# Patient Record
Sex: FEMALE | Race: WHITE | Employment: OTHER | URBAN - METROPOLITAN AREA
[De-identification: names, ages, dates, MRNs, and addresses within clinical notes are randomized per-mention and may not be internally consistent; named-entity substitution may affect disease eponyms.]

---

## 2018-12-12 ENCOUNTER — OFFICE VISIT (OUTPATIENT)
Dept: ENDOCRINOLOGY | Age: 83
End: 2018-12-12
Payer: MEDICARE

## 2018-12-12 VITALS
WEIGHT: 180.6 LBS | SYSTOLIC BLOOD PRESSURE: 118 MMHG | BODY MASS INDEX: 33.23 KG/M2 | DIASTOLIC BLOOD PRESSURE: 70 MMHG | RESPIRATION RATE: 16 BRPM | HEIGHT: 62 IN | OXYGEN SATURATION: 94 % | HEART RATE: 69 BPM

## 2018-12-12 DIAGNOSIS — E55.9 VITAMIN D DEFICIENCY: ICD-10-CM

## 2018-12-12 DIAGNOSIS — M81.0 OSTEOPOROSIS, UNSPECIFIED OSTEOPOROSIS TYPE, UNSPECIFIED PATHOLOGICAL FRACTURE PRESENCE: Primary | ICD-10-CM

## 2018-12-12 DIAGNOSIS — E03.9 HYPOTHYROIDISM, UNSPECIFIED TYPE: ICD-10-CM

## 2018-12-12 LAB — HBA1C MFR BLD: 6.3 %

## 2018-12-12 PROCEDURE — 99214 OFFICE O/P EST MOD 30 MIN: CPT | Performed by: INTERNAL MEDICINE

## 2018-12-12 PROCEDURE — 83036 HEMOGLOBIN GLYCOSYLATED A1C: CPT | Performed by: INTERNAL MEDICINE

## 2018-12-12 RX ORDER — OMEPRAZOLE 20 MG/1
40 CAPSULE, DELAYED RELEASE ORAL DAILY
COMMUNITY

## 2018-12-12 RX ORDER — ISOSORBIDE MONONITRATE 120 MG/1
120 TABLET, EXTENDED RELEASE ORAL DAILY
COMMUNITY

## 2018-12-12 RX ORDER — PREDNISONE 1 MG/1
7.5 TABLET ORAL DAILY
COMMUNITY

## 2018-12-12 RX ORDER — LEVOTHYROXINE SODIUM 0.1 MG/1
100 TABLET ORAL DAILY
Qty: 90 TABLET | Refills: 5 | Status: SHIPPED
Start: 2018-12-12 | End: 2020-03-31

## 2018-12-12 RX ORDER — MAGNESIUM CHLORIDE 64 MG
1 TABLET, DELAYED RELEASE (ENTERIC COATED) ORAL
COMMUNITY

## 2018-12-12 RX ORDER — SOLIFENACIN SUCCINATE 10 MG/1
10 TABLET, FILM COATED ORAL DAILY
COMMUNITY

## 2018-12-12 RX ORDER — NITROGLYCERIN 0.4 MG/1
0.4 TABLET SUBLINGUAL EVERY 5 MIN PRN
COMMUNITY

## 2018-12-12 RX ORDER — ASCORBIC ACID 500 MG
500 TABLET ORAL DAILY
COMMUNITY

## 2018-12-12 RX ORDER — LEVOTHYROXINE SODIUM 0.1 MG/1
100 TABLET ORAL DAILY
COMMUNITY
End: 2018-12-12 | Stop reason: SDUPTHER

## 2018-12-12 RX ORDER — TIMOLOL MALEATE 6.8 MG/ML
1 SOLUTION/ DROPS OPHTHALMIC 2 TIMES DAILY
COMMUNITY

## 2018-12-12 RX ORDER — GABAPENTIN 100 MG/1
100 CAPSULE ORAL NIGHTLY
COMMUNITY

## 2018-12-12 RX ORDER — CHOLECALCIFEROL (VITAMIN D3) 125 MCG
500 CAPSULE ORAL DAILY
COMMUNITY

## 2018-12-12 RX ORDER — FUROSEMIDE 40 MG/1
40 TABLET ORAL DAILY
COMMUNITY

## 2018-12-12 RX ORDER — LISINOPRIL 2.5 MG/1
2.5 TABLET ORAL NIGHTLY
COMMUNITY

## 2018-12-12 RX ORDER — METOPROLOL SUCCINATE 25 MG/1
25 TABLET, EXTENDED RELEASE ORAL NIGHTLY
COMMUNITY

## 2018-12-12 RX ORDER — DORZOLAMIDE HCL 20 MG/ML
1 SOLUTION/ DROPS OPHTHALMIC 2 TIMES DAILY
COMMUNITY

## 2018-12-12 RX ORDER — ROSUVASTATIN CALCIUM 20 MG/1
20 TABLET, COATED ORAL DAILY
COMMUNITY

## 2018-12-12 RX ORDER — VITAMIN E 268 MG
400 CAPSULE ORAL DAILY
COMMUNITY

## 2018-12-12 RX ORDER — CHLORAL HYDRATE 500 MG
3000 CAPSULE ORAL DAILY
COMMUNITY

## 2018-12-12 NOTE — PROGRESS NOTES
ENDOCRINOLOGY CLINIC NOTE    Date of Service: 12/12/2018    Medical Records Reviewed:   Inpatient records, outpatient records, outside records     Care Team:  Primary Care Physician: Dante Mata MD.  Provider: Joel Joaquin MD  Other provider(s):            Reason for the visit:  Type II DM, primary hypothyroidism, Osteoporosis, vitd deficiency,      Type of Visit:  Follow up     History of Present Illness  I had the pleasure of seeing Law Villatoro who is 80year old female in the clinic today for follow up visit   Pedro Smith presents with Type 2 Diabetes Mellitus, hypothyroidism. Osteopenia, History HPTH  Here with Dtr    1. History of primary hyperparathyroidism. s/p parathyroidectomy, large parathyroid adenoma by Dr. Rose Neff in September 2004. She has had only occasional transient hypercalcemia since then nothing in the recent past    10/2018 : Calcium - 9.2    2. Osteoporosis/vitD deficiency   She denies any history fractures. + Chronic oral steroid for years via rheumatology  April 9, 2012, DXA femoral neck T-score of -2, total hip T-score of -1.3 and lumbar spine T-score is 1.4. When we omit L2 and L3 due to osteophytes in vertebra, T-score of lumbar spine is -2. FRAX score;major osteoporotic fracture risk in the next 10 years is 23% and hip fracture is 9.7%. Bisphosphonates are contraindicated as estimated GFR is only 35     Patient received 4 doses of prolia before, restarted prolia in the recent past. Last dose 8/2018     The bone mineral density in 10/2014 --> BMD has increased by 22.5% since the prior study of 04/09/2012 and has increased by 14.3% since the baseline study of 11/29/2000    The bone mineral density in 10/2016 --> BMD has decreased by 2.9% hip, 4.9%forarm since the prior study of 2014     On vitD3 5000 units daily  VitD level now 41.9 was 43.3 was 35.3   The patient denies any history of kidney stones    4. Type 2 diabetes mellitus.    Currently on Basaglar 20 units at bedtime, MG tablet Take 20 mg by mouth daily      Timolol (TIMOPTIC) 0.5 % (DAILY) SOLN ophthalmic solution Place 1 drop into both eyes 2 times daily      dorzolamide (TRUSOPT) 2 % ophthalmic solution Place 1 drop into both eyes 2 times daily      solifenacin (VESICARE) 10 MG tablet Take 5 mg by mouth daily      vitamin B-12 (CYANOCOBALAMIN) 500 MCG tablet Take 500 mcg by mouth daily      vitamin E 400 UNIT capsule Take 400 Units by mouth daily      isosorbide mononitrate (IMDUR) 120 MG extended release tablet Take 120 mg by mouth daily      metoprolol succinate (TOPROL XL) 25 MG extended release tablet Take 25 mg by mouth nightly      gabapentin (NEURONTIN) 100 MG capsule Take 100 mg by mouth 2 times daily. Mino Rough denosumab (PROLIA) 60 MG/ML SOLN SC injection Inject 1 mL into the skin once for 1 dose 1 Syringe 5    insulin glargine (BASAGLAR KWIKPEN) 100 UNIT/ML injection pen Inject 20 Units into the skin nightly 15 pen 5    levothyroxine (SYNTHROID) 100 MCG tablet Take 1 tablet by mouth Daily 90 tablet 5    insulin lispro (HUMALOG KWIKPEN) 100 UNIT/ML pen Take 6 units before breakfast, 6 units before lunch and 9 units before supper + sliding scale. MAX 25 units daily 15 pen 5    Insulin Pen Needle (BD PEN NEEDLE JAVIER U/F) 32G X 4 MM MISC 1 each by Does not apply route 4 times daily With insulin 350 each 5     No current facility-administered medications for this visit. Review of Systems  Constitutional: tired and fatigue. HEENT: No blurred vision, No sore throat, no ear pain, no hair loss  Neck: denied any neck swelling, difficulty swallowing,   Cardio-pulmonary: No CP, SOB or palpitation, No orthopnea or PND. No cough or wheezing. GI: No N/V/D, no constipation, No abdominal pain, no melena or hematochezia   : Denied any dysuria, hematuria, flank pain, discharge, or incontinence. Skin: denied any rash, ulcer, Hirsute, or hyperpigmentation.    MSK: denied any joint deformity, joint pain/swelling,

## 2018-12-12 NOTE — LETTER
ALT 26 03/06/2018   TOTALBILI 0.4 03/06/2018   TOTPROT 6.8 03/06/2018   ALBUMIN 3.2 (L) 03/06/2018      No results found for: WBC, RBC, HGB, HCT, MCV, MCH, MCHC, RDW, PLT, MPV, GRANULOCYTES, BANDS   No results found for: NA, K, CO2, BUN, CALCIUM, GFR   No results found for: TSH, T4FREE, D3IOXFF, FT3, Z5WVUEN, TSI, TPOABS, THGAB  Lab Results   Component Value Date    LABA1C 6.3 12/12/2018     No results found for: CHOL, TRIG, HDL, LDLDIRECT  No results found for: VITD25    All labs medical records and images were reviewed independently     Additional Data Reviewed: Individual visualization of point-of-care blood glucose levels and medications doses    ASSESSMENT & RECOMMENDATIONS   Bill Navarro, a 80 y.o.-old female seen in for the following issues     Osteoporosis/VitD deficiency   · History of chronic steroid use and previous hyperparathyroidism  · Tolerating prolia very well  · In the past she received 4 doses of prolia ([id number])  · We resumed Prolia in 2017, last dose 8/2018 ( Dtr aware when next dose due 2/2019)  · Continue Vitamin D 5000 units daily and check Vitamin D soon before prolia injection  · Avoid prolonged immobilization, exercise advised    Secondary Adrenal Insufficiency  · Due to chronic steroid use. Pt on prolonged prednisone therapy for PMR   · Patient need to be on at least maintenance dose of steroid all the time and stress dose for any stressful events. Primary Hhypothyroidism  · Continue current Levothyroxine 100 mcg daily     Type II DM   · Fair control  · High risk for falls, need to limit risk of hypoglycemia  · Continue basaglar 20 units at bedtime, change Humalog before meals to 6/6/9  + sliding scale 1:50>150   · The patient was advised to check blood sugars 3 times a day before meals and at bedtime and fax the results to our office in few weeks.   · Discussed with patient A1c and blood sugar goals   · Optimal blood sugars: 100-140 pre-prandial, < 180 peak post-prandial · The patient counseled about the complications of uncontrolled diabetes   · Patient was counselled about the importance of self-blood glucose monitoring and eating consistent carb diet to avoid blood sugar fluctuations   · Patient up to date with the routine diabetes maintenance and prevention  · Diabetes labs before next visit     H/o Hypercalcemia  · No overt elevation since late 2015  · Needs followed over time     Follow up  · 4/2019     The above issues were reviewed with the patient who understood and agreed with the plan. 30 minutes were spent today in management of this patient. More than 50% of time spent on counseling of patient on above diagnosis. Thank you for allowing us to participate in the care of this patient. Please do not hesitate to contact us with any additional questions.      Cheryl Gardner MD  Endocrinologist, [facility])   [address] [place], [place] [address]   Phone: [phone]  Fax: [phone]  --------------------------------------------  Electronically signed

## 2019-02-13 ENCOUNTER — TELEPHONE (OUTPATIENT)
Dept: ENDOCRINOLOGY | Age: 84
End: 2019-02-13

## 2019-04-01 ENCOUNTER — OFFICE VISIT (OUTPATIENT)
Dept: ENDOCRINOLOGY | Age: 84
End: 2019-04-01
Payer: MEDICARE

## 2019-04-01 VITALS
HEART RATE: 77 BPM | SYSTOLIC BLOOD PRESSURE: 112 MMHG | OXYGEN SATURATION: 95 % | BODY MASS INDEX: 32.13 KG/M2 | RESPIRATION RATE: 16 BRPM | DIASTOLIC BLOOD PRESSURE: 78 MMHG | WEIGHT: 174.6 LBS | HEIGHT: 62 IN

## 2019-04-01 DIAGNOSIS — E03.9 HYPOTHYROIDISM, UNSPECIFIED TYPE: ICD-10-CM

## 2019-04-01 DIAGNOSIS — E55.9 VITAMIN D DEFICIENCY: ICD-10-CM

## 2019-04-01 DIAGNOSIS — M81.0 OSTEOPOROSIS, UNSPECIFIED OSTEOPOROSIS TYPE, UNSPECIFIED PATHOLOGICAL FRACTURE PRESENCE: Primary | ICD-10-CM

## 2019-04-01 PROCEDURE — G8417 CALC BMI ABV UP PARAM F/U: HCPCS | Performed by: INTERNAL MEDICINE

## 2019-04-01 PROCEDURE — 4040F PNEUMOC VAC/ADMIN/RCVD: CPT | Performed by: INTERNAL MEDICINE

## 2019-04-01 PROCEDURE — 99214 OFFICE O/P EST MOD 30 MIN: CPT | Performed by: INTERNAL MEDICINE

## 2019-04-01 PROCEDURE — 1123F ACP DISCUSS/DSCN MKR DOCD: CPT | Performed by: INTERNAL MEDICINE

## 2019-04-01 PROCEDURE — G8427 DOCREV CUR MEDS BY ELIG CLIN: HCPCS | Performed by: INTERNAL MEDICINE

## 2019-04-01 PROCEDURE — 1090F PRES/ABSN URINE INCON ASSESS: CPT | Performed by: INTERNAL MEDICINE

## 2019-04-01 PROCEDURE — 1036F TOBACCO NON-USER: CPT | Performed by: INTERNAL MEDICINE

## 2019-04-01 NOTE — PROGRESS NOTES
ENDOCRINOLOGY CLINIC NOTE    Date of Service: 4/1/2019    Care Team:  Primary Care Physician: Dayton Sullivan MD.  Provider: Britany Thompson MD  Other provider(s):            Reason for the visit:  Insulin dependent diabetes mellitus, primary hypothyroidism, Osteoporosis, vitd deficiency,     History of Present Illness  I had the pleasure of seeing Ganesh Bates who is 80y.o. year old female in the clinic today for follow up visit   Edelmira Rico presents with Diabetes Mellitus, hypothyroidism. Osteopenia, History HPTH  Here with Dtr    History of primary hyperparathyroidism. s/p parathyroidectomy, large parathyroid adenoma by Dr. Carlo Yusuf in September 2004. She has had only occasional transient hypercalcemia since then nothing in the recent past  3/4/2019 - Calcium 9.6, PTH 65,    Osteoporosis/vitD deficiency   She denies any history fractures. + Chronic oral steroid for years via rheumatology  April 9, 2012, DXA femoral neck T-score of -2, total hip T-score of -1.3 and lumbar spine T-score is 1.4. When we omit L2 and L3 due to osteophytes in vertebra, T-score of lumbar spine is -2. FRAX score;major osteoporotic fracture risk in the next 10 years is 23% and hip fracture is 9.7%. Bisphosphonates are contraindicated as estimated GFR < 35   Tolerating prolia very well  In the past she received 4 doses of prolia ([id number])  We resumed Prolia in 2017, last dose 2/2019 ( Dtr aware when next dose due 8/2019)  Received Prolia injection at infusion center in [facility]     The bone mineral density in 10/2014 --> BMD has increased by 22.5% since the prior study of 04/09/2012 and has increased by 14.3% since the baseline study of 11/29/2000    The bone mineral density in 10/2016 --> BMD has decreased by 2.9% hip, 4.9%forarm since the prior study of 2014     On vitD3 5000 units daily  VitD level 53 was 41.9 was 43.3 was 35.3   The patient denies any history of kidney stones    Type 2 diabetes mellitus.    Currently on DeckDAQ 20 units at bedtime, Humalog 8 units before breakfast,10 units before lunch and 9 units before dinner. +ssi 1u/50>150    Check BG 4 times/day most readings at goal   HA1c: 7.4 was 6.3 was 7.8 was 7.7 was 7.8 was 7.0 was 7.0  Microalbumin ratio 3/2018- 16.8 was 34.1, creatinine 1.07    Reports Sx of low BSs in the afternoon related to house work, mild easy to treat  Gained 8 pounds since last apt - has some fluid retention  Has back and leg pain - long term issue    Microvascular complications: +early diabetic retinopathy, but did not require laser treatment. Macrovascular complications: CAD  Routine podiatrist care every 3 months   Routine eye exam: q 3 months - last exam 10/15/18 - has macular degeneration - had recent avastin injection  Flu shot yearly, Pneumovax previously    Primary Hypothyroidism - has been stable on synthroid 100 mcg daily. Patient takes Synthroid in the morning at empty stomach, wait one hour before eating , avoid multivitamins containing calcium  or iron with it. 3/4/2019 - TSH 1.88, Free T4 1.03       Secondary adrenal insufficiency  She is on prednisone treatment for more than 10 years, anywhere between 5-7.5 mg for polymyalgia rheumatica.   This is prescribed by Dr. Cesar Moser , Taking 7.5 mg Daily for 1-2 years \"he wants to raise it but sugars will go up\"      PAST MEDICAL HISTORY   Past Medical History:   Diagnosis Date    B12 deficiency     Current chronic use of systemic steroids     DM type 2 (diabetes mellitus, type 2) ([address]     GERD (gastroesophageal reflux disease)     Hypercalcemia     Hyperlipidemia     Osteoporosis     PMR (polymyalgia rheumatica) (HCC)     Primary hyperparathyroidism ([address]     Secondary adrenal insufficiency (HCC)     Vitamin D deficiency      PAST SURGICAL HISTORY   Past Surgical History:   Procedure Laterality Date    CHOLECYSTECTOMY      PARATHYROIDECTOMY      TOTAL HIP ARTHROPLASTY Left     Lt    TOTAL KNEE ARTHROPLASTY Right      SOCIAL HISTORY   Social History     Socioeconomic History    Marital status:       Spouse name: Not on file    Number of children: Not on file    Years of education: Not on file    Highest education level: Not on file   Occupational History    Not on file   Social Needs    Financial resource strain: Not on file    Food insecurity:     Worry: Not on file     Inability: Not on file    Transportation needs:     Medical: Not on file     Non-medical: Not on file   Tobacco Use    Smoking status: Never Smoker    Smokeless tobacco: Never Used   Substance and Sexual Activity    Alcohol use: No    Drug use: No    Sexual activity: Not on file   Lifestyle    Physical activity:     Days per week: Not on file     Minutes per session: Not on file    Stress: Not on file   Relationships    Social connections:     Talks on phone: Not on file     Gets together: Not on file     Attends Latter day service: Not on file     Active member of club or organization: Not on file     Attends meetings of clubs or organizations: Not on file     Relationship status: Not on file    Intimate partner violence:     Fear of current or ex partner: Not on file     Emotionally abused: Not on file     Physically abused: Not on file     Forced sexual activity: Not on file   Other Topics Concern    Not on file   Social History Narrative    Not on file     FAMILY HISTORY   Family History   Problem Relation Age of Onset    Diabetes Mother     Thyroid Disease Mother     Heart Disease Father     Diabetes Sister     Heart Disease Sister     Heart Attack Brother     Diabetes Sister     Heart Disease Sister     Heart Attack Brother     Diabetes Daughter     No Known Problems Son      ALLERGIES AND DRUG REACTIONS   No Known Allergies    CURRENT MEDICATIONS     Current Outpatient Medications   Medication Sig Dispense Refill    vitamin C (ASCORBIC ACID) 500 MG tablet Take 500 mg by mouth daily      aspirin 81 MG tablet Take 81 mg by mouth daily      Cholecalciferol (VITAMIN D3) 5000 units TABS Take by mouth daily      Coenzyme Q10 (COQ-10) 100 MG CAPS Take by mouth daily      Omega-3 Fatty Acids (FISH OIL) 1000 MG CAPS Take 3,000 mg by mouth daily      furosemide (LASIX) 40 MG tablet Take 40 mg by mouth daily      lisinopril (PRINIVIL;ZESTRIL) 2.5 MG tablet Take 2.5 mg by mouth nightly      magnesium chloride (MAG64) 64 MG TBEC extended release tablet Take 1 tablet by mouth daily (with breakfast)      nitroGLYCERIN (NITROSTAT) 0.4 MG SL tablet Place 0.4 mg under the tongue every 5 minutes as needed for Chest pain up to max of 3 total doses. If no relief after 1 dose, call 911.  omeprazole (PRILOSEC) 20 MG delayed release capsule Take 20 mg by mouth 2 times daily      predniSONE (DELTASONE) 5 MG tablet Take 7.5 mg by mouth daily      Multiple Vitamins-Minerals (PRESERVISION AREDS PO) Take by mouth daily      rosuvastatin (CRESTOR) 20 MG tablet Take 20 mg by mouth daily      Timolol (TIMOPTIC) 0.5 % (DAILY) SOLN ophthalmic solution Place 1 drop into both eyes 2 times daily      dorzolamide (TRUSOPT) 2 % ophthalmic solution Place 1 drop into both eyes 2 times daily      solifenacin (VESICARE) 10 MG tablet Take 5 mg by mouth daily      vitamin B-12 (CYANOCOBALAMIN) 500 MCG tablet Take 500 mcg by mouth daily      vitamin E 400 UNIT capsule Take 400 Units by mouth daily      isosorbide mononitrate (IMDUR) 120 MG extended release tablet Take 120 mg by mouth daily      metoprolol succinate (TOPROL XL) 25 MG extended release tablet Take 25 mg by mouth nightly      gabapentin (NEURONTIN) 100 MG capsule Take 100 mg by mouth daily.        denosumab (PROLIA) 60 MG/ML SOLN SC injection Inject 1 mL into the skin once for 1 dose 1 Syringe 5    insulin glargine (BASAGLAR KWIKPEN) 100 UNIT/ML injection pen Inject 20 Units into the skin nightly 15 pen 5    levothyroxine (SYNTHROID) 100 MCG tablet Take 1 tablet by mouth Daily 90 tablet 5    insulin lispro (HUMALOG KWIKPEN) 100 UNIT/ML pen Take 6 units before breakfast, 6 units before lunch and 9 units before supper + sliding scale. MAX 25 units daily 15 pen 5    Insulin Pen Needle (BD PEN NEEDLE JAVIER U/F) 32G X 4 MM MISC 1 each by Does not apply route 4 times daily With insulin 350 each 5     No current facility-administered medications for this visit. Review of Systems  Constitutional: tired and fatigue. HEENT: No blurred vision, No sore throat, no ear pain, no hair loss  Neck: denied any neck swelling, difficulty swallowing,   Cardio-pulmonary: No CP, SOB or palpitation, No orthopnea or PND. No cough or wheezing. GI: No N/V/D, no constipation, No abdominal pain, no melena or hematochezia   : Denied any dysuria, hematuria, flank pain, discharge, or incontinence. Skin: denied any rash, ulcer, Hirsute, or hyperpigmentation. MSK: denied any joint deformity, joint pain/swelling, muscle pain, or back pain. Neuro: balance problem, using walker     OBJECTIVE    /78 (Site: Right Upper Arm, Position: Sitting, Cuff Size: Medium Adult)   Pulse 77   Resp 16   Ht 5' 2\" (1.575 m)   Wt 174 lb 9.6 oz (79.2 kg)   SpO2 95%   BMI 31.93 kg/m²   BP Readings from Last 4 Encounters:   04/01/19 112/78   12/12/18 118/70     Wt Readings from Last 6 Encounters:   04/01/19 174 lb 9.6 oz (79.2 kg)   12/12/18 180 lb 9.6 oz (81.9 kg)       Physical examination:  General: awake alert, oriented x3, no abnormal position or movements. HEENT: normocephalic non-traumatic, no exophthalmos   Neck: supple, no LN enlargement, no thyromegaly, no thyroid tenderness, no JVD. Pulm: Clear equal air entry no added sounds, no wheezing or rhonchi    CVS: S1 + S2, no murmur, no heave. Dorsalis pedis pulse palpable but weak. + LE edema bilateral    Abd: soft lax, no tenderness, no organomegaly, audible bowel sounds.    Skin: warm, no lesions, no rash. + callus, no Ulcers, mild acanthosis nigricans   Neuro: CN intact, Monofilament sensation decreased bilateral , + balance problem using walker   Psych: normal mood, and affect      Review of Laboratory Data:  I have reviewed the following:  BASIC METABOLIC PANELResulted: 5/0/9951 3:25 PM  [facility] ([address])  Component Name Value   Glucose 139 (H)   Urea Nitrogen 32 (H)   Creatinine 0.97   BUN/CREATININE RATIO 33   Sodium(Na) 137   Potassium(K) 4.6   Chloride(Cl) 103   Carbon Dioxide(CO2) 31.0   ANION GAP 7.6   Calcium(Ca) 9.6   CALCULATED OSMOLALITY 293   EGFR NON  52 (L)     LIPID BATTERYResulted: 3/4/2019 3:25 PM  Component Name Value   Cholesterol 107   Triglyceride 159 (H)   High Density Lipoprotein(HDL) 47   Very Low Density Lipoprotein 32   Low Density Lipoprotein 28     VITAMIN D 25-OH SCREEN FOR DEFICIENCY/TOXICITYResulted: 3/7/2019   Component Name Value   Vitamin D, 25-Hydroxy 53     3/4/2019 - A1c 7.5, PTH 65, TSH 1.88, Free T4 1.03   No results found for: WBC, RBC, HGB, HCT, MCV, MCH, MCHC, RDW, PLT, MPV, GRANULOCYTES, BANDS   No results found for: NA, K, CO2, BUN, CALCIUM, GFR   No results found for: TSH, T4FREE, B8CTTVH, FT3, X9GMOHI, TSI, TPOABS, THGAB  Lab Results   Component Value Date    LABA1C 6.3 12/12/2018     No results found for: CHOL, TRIG, HDL, LDLDIRECT  No results found for: VITD25    Medical Records/Labs/Images review:   I personally reviewed and summarized previous records   All labs and imaging were reviewed independently     [facility], a 80 y.o.-old female seen in for the following issues     Osteoporosis/VitD deficiency   · History of chronic steroid use and previous hyperparathyroidism  · Tolerating prolia very well  · In the past she received 4 doses of prolia ([id number])  · We resumed Prolia in 2017, last dose 2/2019 ( Dtr aware when next dose due 8/2019)  · Continue Vitamin D 5000 units daily  · Avoid prolonged immobilization, exercise advised    Secondary Adrenal Insufficiency  · Due to chronic steroid use. Pt on prolonged prednisone therapy for PMR   · Patient need to be on at least maintenance dose of steroid all the time and stress dose for any stressful events. Primary Hhypothyroidism  · Continue current Levothyroxine 100 mcg daily     Type 2 DM   · High risk for falls, need to limit risk of hypoglycemia  · Will slightly change basaglar 22 units at bedtime, change Humalog before meals to 88/10  + sliding scale 1:50>150   · The patient was advised to check blood sugars 4 times a day before meals and at bedtime and fax the results to our office in few weeks. · Discussed with patient A1c and blood sugar goals   · Optimal blood sugars: 100-140 pre-prandial, < 180 peak post-prandial  · The patient counseled about the complications of uncontrolled diabetes   · Patient was counselled about the importance of self-blood glucose monitoring and eating consistent carb diet to avoid blood sugar fluctuations   · Patient up to date with the routine diabetes maintenance and prevention    H/o primary hyperparathyroidism   · s/p parathyroidectomy, large parathyroid adenoma by Dr. Franny Cates in September 2004  · Ca and PTH remained normal     The above issues were reviewed with the patient who understood and agreed with the plan. Thank you for allowing us to participate in the care of this patient. Please do not hesitate to contact us with any additional questions. Diagnosis Orders   1. Osteoporosis, unspecified osteoporosis type, unspecified pathological fracture presence     2. Vitamin D deficiency     3. IDDM (insulin dependent diabetes mellitus) ([facility]     4.  Hypothyroidism, unspecified type         Oneal Crabtree MD  Endocrinologist, [facility])   [address] [place] [address], [address] [place] [address],[facility] [id number]   Phone: [phone]  Fax: [phone]  --------------------------------------------  Electronically signed

## 2019-04-01 NOTE — PATIENT INSTRUCTIONS
Recommendations for today's visit  · Take Basaglar 22  units daily at bedtime  · Take Humalog 8 units before breakfast and lunch and 10 units before supper + same sliding scale   · Continue current dose of levothyroxine    · Check Blood sugar 3 times/day before meals and at bedtime and send us sugar log in a week or two    These are your blood sugar, blood pressure, cholesterol and A1c goals:  · Blood sugar fastin mg/dl to 130 mg/dl  · Blood sugar before meals: <150 mg/dl  · Peak blood sugar lower than 180 mg/dl  · Bad cholesterol (LDL cholesterol): less than 100 mg/dl  · Blood pressure: less than 140/80 mmHg\  · A1c: between 6.5 - 7%      Steps for managing low blood sugar  1. Eat 15 grams of glucose of simple carbohydrate, as found in:   1 tablespoon sugar, Pauline or corn syrup    4 oz (1/2 cup) of juice or regular soda   Glucose Tablet or gel (follow package instruction)   2. Wait 15 min and check blood sugar again   3. Repeat until blood sugar within range  4.  Once within range, follow up with snack or meal within 1 hour      I you have any questions please call Dr. Ami Vergara office       Scout Greer MD  Endocrinologist, [facility])   [address] [facility] [address]   Phone: [phone]  Fax: [phone]

## 2019-04-01 NOTE — LETTER
[address] [place] [address] Department of Endocrinology Diabetes and Metabolism       Provider: Pearson Soulier MD  [address], [facility] [address]   Phone: [phone]  Fax: [phone]    Primary Care Physician: Glenn Hsu MD   Referring Provider: No ref. provider found    Patient: Susanne Lane  YOB: 1929  Date of Visit: 4/1/2019      Dear Dr. Glenn Hsu MD   I had the pleasure of seeing your patient Susanne Lane today at endocrine clinic for follow up visit and I enclosed a copy of the office visit completed today. Thank you very much for asking us to participate in the care of this very pleasant patient. Please don't hesitate to call if there are any further questions or concerns. Sincerely   Pearson Soulier MD  Endocrinologist, [facility])   [address], [address] [address]   Phone: [phone]  Fax: [phone]      ENDOCRINOLOGY CLINIC NOTE    Date of Service: 4/1/2019    Care Team:  Primary Care Physician: Glenn Hsu MD.  Provider: Pearson Soulier MD  Other provider(s):            Reason for the visit:  Type II DM, primary hypothyroidism, Osteoporosis, vitd deficiency,     History of Present Illness  I had the pleasure of seeing Susanne Lane who is 80y.o. year old female in the clinic today for follow up visit   Bermudez Tawny presents with Type 2 Diabetes Mellitus, hypothyroidism. Osteopenia, History HPTH  Here with Dtr    History of primary hyperparathyroidism. s/p parathyroidectomy, large parathyroid adenoma by Dr. Adrian Sutherland in September 2004. She has had only occasional transient hypercalcemia since then nothing in the recent past  3/4/2019 - Calcium 9.6, PTH 65,    Osteoporosis/vitD deficiency   She denies any history fractures. + Chronic oral steroid for years via rheumatology  April 9, 2012, DXA femoral neck T-score of -2, total hip T-score of -1.3 and lumbar spine T-score is 1.4.  When we omit L2 and L3 due to osteophytes in vertebra, T-score of lumbar spine is -2. FRAX score;major osteoporotic fracture risk in the next 10 years is 23% and hip fracture is 9.7%. Bisphosphonates are contraindicated as estimated GFR < 35   Tolerating prolia very well  In the past she received 4 doses of prolia ([id number])  We resumed Prolia in 2017, last dose 2/2019 ( Dtr aware when next dose due 8/2019)  Received Prolia injection at infusion center in [facility]     The bone mineral density in 10/2014 --> BMD has increased by 22.5% since the prior study of 04/09/2012 and has increased by 14.3% since the baseline study of 11/29/2000    The bone mineral density in 10/2016 --> BMD has decreased by 2.9% hip, 4.9%forarm since the prior study of 2014     On vitD3 5000 units daily  VitD level 53 was 41.9 was 43.3 was 35.3   The patient denies any history of kidney stones    Type 2 diabetes mellitus. Currently on Basaglar 20 units at bedtime, Humalog 8 units before breakfast,10 units before lunch and 9 units before dinner. +ssi 1u/50>150    Check BG 4 times/day most readings at goal   HA1c: 7.4 was 6.3 was 7.8 was 7.7 was 7.8 was 7.0 was 7.0  Microalbumin ratio 3/2018- 16.8 was 34.1, creatinine 1.07    Reports Sx of low BSs in the afternoon related to house work, mild easy to treat  Gained 8 pounds since last apt - has some fluid retention  Has back and leg pain - long term issue    Microvascular complications: +early diabetic retinopathy, but did not require laser treatment. Macrovascular complications: CAD  Routine podiatrist care every 3 months   Routine eye exam: q 3 months - last exam 10/15/18 - has macular degeneration - had recent avastin injection  Flu shot yearly, Pneumovax previously    Primary Hypothyroidism - has been stable on synthroid 100 mcg daily. Patient takes Synthroid in the morning at empty stomach, wait one hour before eating , avoid multivitamins containing calcium  or iron with it.   3/4/2019 - TSH 1.88, Free T4 1.03 Secondary adrenal insufficiency  She is on prednisone treatment for more than 10 years, anywhere between 5-7.5 mg for polymyalgia rheumatica. This is prescribed by Dr. Peter Urbina , Taking 7.5 mg Daily for 1-2 years \"he wants to raise it but sugars will go up\"      PAST MEDICAL HISTORY   Past Medical History:   Diagnosis Date    B12 deficiency     Current chronic use of systemic steroids     [facility]     GERD (gastroesophageal reflux disease)     Hypercalcemia     Hyperlipidemia     Osteoporosis     PMR (polymyalgia rheumatica) (HCC)     Primary hyperparathyroidism (HCC)     Secondary adrenal insufficiency (HCC)     Vitamin D deficiency      PAST SURGICAL HISTORY   Past Surgical History:   Procedure Laterality Date    CHOLECYSTECTOMY      PARATHYROIDECTOMY      TOTAL HIP ARTHROPLASTY Left     Lt    TOTAL KNEE ARTHROPLASTY Right      SOCIAL HISTORY   Social History     Socioeconomic History    Marital status:       Spouse name: Not on file    Number of children: Not on file    Years of education: Not on file    Highest education level: Not on file   Occupational History    Not on file   Social Needs    Financial resource strain: Not on file    Food insecurity:     Worry: Not on file     Inability: Not on file    Transportation needs:     Medical: Not on file     Non-medical: Not on file   Tobacco Use    Smoking status: Never Smoker    Smokeless tobacco: Never Used   Substance and Sexual Activity    Alcohol use: No    Drug use: No    Sexual activity: Not on file   Lifestyle    Physical activity:     Days per week: Not on file     Minutes per session: Not on file    Stress: Not on file   Relationships    Social connections:     Talks on phone: Not on file     Gets together: Not on file     Attends Denominational service: Not on file     Active member of club or organization: Not on file     Attends meetings of clubs or organizations: Not on file Relationship status: Not on file    Intimate partner violence:     Fear of current or ex partner: Not on file     Emotionally abused: Not on file     Physically abused: Not on file     Forced sexual activity: Not on file   Other Topics Concern    Not on file   Social History Narrative    Not on file     FAMILY HISTORY   Family History   Problem Relation Age of Onset    Diabetes Mother     Thyroid Disease Mother     Heart Disease Father     Diabetes Sister     Heart Disease Sister     Heart Attack Brother     Diabetes Sister     Heart Disease Sister     Heart Attack Brother     Diabetes Daughter     No Known Problems Son      ALLERGIES AND DRUG REACTIONS   No Known Allergies    CURRENT MEDICATIONS     Current Outpatient Medications   Medication Sig Dispense Refill    vitamin C (ASCORBIC ACID) 500 MG tablet Take 500 mg by mouth daily      aspirin 81 MG tablet Take 81 mg by mouth daily      Cholecalciferol (VITAMIN D3) 5000 units TABS Take by mouth daily      Coenzyme Q10 (COQ-10) 100 MG CAPS Take by mouth daily      Omega-3 Fatty Acids (FISH OIL) 1000 MG CAPS Take 3,000 mg by mouth daily      furosemide (LASIX) 40 MG tablet Take 40 mg by mouth daily      lisinopril (PRINIVIL;ZESTRIL) 2.5 MG tablet Take 2.5 mg by mouth nightly      magnesium chloride (MAG64) 64 MG TBEC extended release tablet Take 1 tablet by mouth daily (with breakfast)      nitroGLYCERIN (NITROSTAT) 0.4 MG SL tablet Place 0.4 mg under the tongue every 5 minutes as needed for Chest pain up to max of 3 total doses. If no relief after 1 dose, call 911.       omeprazole (PRILOSEC) 20 MG delayed release capsule Take 20 mg by mouth 2 times daily      predniSONE (DELTASONE) 5 MG tablet Take 7.5 mg by mouth daily      Multiple Vitamins-Minerals (PRESERVISION AREDS PO) Take by mouth daily      rosuvastatin (CRESTOR) 20 MG tablet Take 20 mg by mouth daily      Timolol (TIMOPTIC) 0.5 % (DAILY) SOLN ophthalmic solution Place 1 drop into both eyes 2 times daily      dorzolamide (TRUSOPT) 2 % ophthalmic solution Place 1 drop into both eyes 2 times daily      solifenacin (VESICARE) 10 MG tablet Take 5 mg by mouth daily      vitamin B-12 (CYANOCOBALAMIN) 500 MCG tablet Take 500 mcg by mouth daily      vitamin E 400 UNIT capsule Take 400 Units by mouth daily      isosorbide mononitrate (IMDUR) 120 MG extended release tablet Take 120 mg by mouth daily      metoprolol succinate (TOPROL XL) 25 MG extended release tablet Take 25 mg by mouth nightly      gabapentin (NEURONTIN) 100 MG capsule Take 100 mg by mouth daily.  denosumab (PROLIA) 60 MG/ML SOLN SC injection Inject 1 mL into the skin once for 1 dose 1 Syringe 5    insulin glargine (BASAGLAR KWIKPEN) 100 UNIT/ML injection pen Inject 20 Units into the skin nightly 15 pen 5    levothyroxine (SYNTHROID) 100 MCG tablet Take 1 tablet by mouth Daily 90 tablet 5    insulin lispro (HUMALOG KWIKPEN) 100 UNIT/ML pen Take 6 units before breakfast, 6 units before lunch and 9 units before supper + sliding scale. MAX 25 units daily 15 pen 5    Insulin Pen Needle (BD PEN NEEDLE JAVIER U/F) 32G X 4 MM MISC 1 each by Does not apply route 4 times daily With insulin 350 each 5     No current facility-administered medications for this visit. Review of Systems  Constitutional: tired and fatigue. HEENT: No blurred vision, No sore throat, no ear pain, no hair loss  Neck: denied any neck swelling, difficulty swallowing,   Cardio-pulmonary: No CP, SOB or palpitation, No orthopnea or PND. No cough or wheezing. GI: No N/V/D, no constipation, No abdominal pain, no melena or hematochezia   : Denied any dysuria, hematuria, flank pain, discharge, or incontinence. Skin: denied any rash, ulcer, Hirsute, or hyperpigmentation. MSK: denied any joint deformity, joint pain/swelling, muscle pain, or back pain.   Neuro: balance problem, using walker     OBJECTIVE No results found for: WBC, RBC, HGB, HCT, MCV, MCH, MCHC, RDW, PLT, MPV, GRANULOCYTES, BANDS   No results found for: NA, K, CO2, BUN, CALCIUM, GFR   No results found for: TSH, T4FREE, K9GXDRU, FT3, V6PFUME, TSI, TPOABS, THGAB  Lab Results   Component Value Date    LABA1C 6.3 12/12/2018     No results found for: CHOL, TRIG, HDL, LDLDIRECT  No results found for: [address] [facility]  [address] Records/Labs/Images review:   I personally reviewed and summarized previous records   All labs and imaging were reviewed independently     [address], a 80 y.o.-old female seen in for the following issues     Osteoporosis/VitD deficiency   · History of chronic steroid use and previous hyperparathyroidism  · Tolerating prolia very well  · In the past she received 4 doses of prolia ([id number])  · We resumed Prolia in 2017, last dose 2/2019 ( Dtr aware when next dose due 8/2019)  · Continue Vitamin D 5000 units daily  · Avoid prolonged immobilization, exercise advised    Secondary Adrenal Insufficiency  · Due to chronic steroid use. Pt on prolonged prednisone therapy for PMR   · Patient need to be on at least maintenance dose of steroid all the time and stress dose for any stressful events. Primary Hhypothyroidism  · Continue current Levothyroxine 100 mcg daily     Type 2 DM   · High risk for falls, need to limit risk of hypoglycemia  · Will slightly change basaglar 22 units at bedtime, change Humalog before meals to 88/10  + sliding scale 1:50>150   · The patient was advised to check blood sugars 3 times a day before meals and at bedtime and fax the results to our office in few weeks.   · Discussed with patient A1c and blood sugar goals   · Optimal blood sugars: 100-140 pre-prandial, < 180 peak post-prandial  · The patient counseled about the complications of uncontrolled diabetes   · Patient was counselled about the importance of self-blood glucose monitoring and eating consistent carb diet to avoid blood sugar fluctuations   · Patient up to date with the routine diabetes maintenance and prevention    H/o primary hyperparathyroidism   · s/p parathyroidectomy, large parathyroid adenoma by Dr. Carlo Yusuf in September 2004  · Ca and PTH remained normal     The above issues were reviewed with the patient who understood and agreed with the plan. Thank you for allowing us to participate in the care of this patient. Please do not hesitate to contact us with any additional questions. Diagnosis Orders   1. Osteoporosis, unspecified osteoporosis type, unspecified pathological fracture presence     2. Vitamin D deficiency     3. IDDM (insulin dependent diabetes mellitus) ([facility]     4.  Hypothyroidism, unspecified type         Britany Thompson MD  Endocrinologist, [address]   [address] [facility], [address] [place] [address],[facility] [address] [id number]   Phone: [phone]  Fax: [phone]  --------------------------------------------  Electronically signed

## 2019-05-21 ENCOUNTER — TELEPHONE (OUTPATIENT)
Dept: ENDOCRINOLOGY | Age: 84
End: 2019-05-21

## 2019-05-21 NOTE — TELEPHONE ENCOUNTER
Can you please addend the patient's 4/1/19 office note? It needs to state that she's currently testing 4x/day. Wilver needs it so that she can get the [facility]. Thank you!

## 2019-08-05 ENCOUNTER — OFFICE VISIT (OUTPATIENT)
Dept: ENDOCRINOLOGY | Age: 84
End: 2019-08-05
Payer: MEDICARE

## 2019-08-05 VITALS
DIASTOLIC BLOOD PRESSURE: 82 MMHG | BODY MASS INDEX: 32.42 KG/M2 | OXYGEN SATURATION: 98 % | HEIGHT: 62 IN | RESPIRATION RATE: 16 BRPM | SYSTOLIC BLOOD PRESSURE: 132 MMHG | WEIGHT: 176.2 LBS | HEART RATE: 64 BPM

## 2019-08-05 DIAGNOSIS — E55.9 VITAMIN D DEFICIENCY: ICD-10-CM

## 2019-08-05 DIAGNOSIS — M81.0 OSTEOPOROSIS, UNSPECIFIED OSTEOPOROSIS TYPE, UNSPECIFIED PATHOLOGICAL FRACTURE PRESENCE: ICD-10-CM

## 2019-08-05 DIAGNOSIS — Z86.39 H/O HYPERPARATHYROIDISM: ICD-10-CM

## 2019-08-05 DIAGNOSIS — E03.9 HYPOTHYROIDISM, UNSPECIFIED TYPE: ICD-10-CM

## 2019-08-05 LAB — HBA1C MFR BLD: 6.5 %

## 2019-08-05 PROCEDURE — 99215 OFFICE O/P EST HI 40 MIN: CPT | Performed by: INTERNAL MEDICINE

## 2019-08-05 PROCEDURE — G8427 DOCREV CUR MEDS BY ELIG CLIN: HCPCS | Performed by: INTERNAL MEDICINE

## 2019-08-05 PROCEDURE — 83036 HEMOGLOBIN GLYCOSYLATED A1C: CPT | Performed by: INTERNAL MEDICINE

## 2019-08-05 PROCEDURE — 4040F PNEUMOC VAC/ADMIN/RCVD: CPT | Performed by: INTERNAL MEDICINE

## 2019-08-05 PROCEDURE — 1123F ACP DISCUSS/DSCN MKR DOCD: CPT | Performed by: INTERNAL MEDICINE

## 2019-08-05 PROCEDURE — 1090F PRES/ABSN URINE INCON ASSESS: CPT | Performed by: INTERNAL MEDICINE

## 2019-08-05 PROCEDURE — 1036F TOBACCO NON-USER: CPT | Performed by: INTERNAL MEDICINE

## 2019-08-05 PROCEDURE — G8417 CALC BMI ABV UP PARAM F/U: HCPCS | Performed by: INTERNAL MEDICINE

## 2019-08-05 NOTE — LETTER
Forced sexual activity: Not on file   Other Topics Concern    Not on file   Social History Narrative    Not on file     FAMILY HISTORY   Family History   Problem Relation Age of Onset    Diabetes Mother     Thyroid Disease Mother     Heart Disease Father     Diabetes Sister     Heart Disease Sister     Heart Attack Brother     Diabetes Sister     Heart Disease Sister     Heart Attack Brother     Diabetes Daughter     No Known Problems Son      ALLERGIES AND DRUG REACTIONS   No Known Allergies    CURRENT MEDICATIONS     Current Outpatient Medications   Medication Sig Dispense Refill    Insulin Pen Needle (BD PEN NEEDLE JAVIER U/F) 32G X 4 MM MISC 1 each by Does not apply route 4 times daily With insulin 350 each 5    vitamin C (ASCORBIC ACID) 500 MG tablet Take 500 mg by mouth daily      aspirin 81 MG tablet Take 81 mg by mouth daily      Cholecalciferol (VITAMIN D3) 5000 units TABS Take by mouth daily      Coenzyme Q10 (COQ-10) 100 MG CAPS Take by mouth daily      Omega-3 Fatty Acids (FISH OIL) 1000 MG CAPS Take 3,000 mg by mouth daily      furosemide (LASIX) 40 MG tablet Take 40 mg by mouth daily      lisinopril (PRINIVIL;ZESTRIL) 2.5 MG tablet Take 2.5 mg by mouth nightly      magnesium chloride (MAG64) 64 MG TBEC extended release tablet Take 1 tablet by mouth daily (with breakfast)      nitroGLYCERIN (NITROSTAT) 0.4 MG SL tablet Place 0.4 mg under the tongue every 5 minutes as needed for Chest pain up to max of 3 total doses. If no relief after 1 dose, call 911.       omeprazole (PRILOSEC) 20 MG delayed release capsule Take 20 mg by mouth 2 times daily      predniSONE (DELTASONE) 5 MG tablet Take 7.5 mg by mouth daily      Multiple Vitamins-Minerals (PRESERVISION AREDS PO) Take by mouth daily      rosuvastatin (CRESTOR) 20 MG tablet Take 20 mg by mouth daily      Timolol (TIMOPTIC) 0.5 % (DAILY) SOLN ophthalmic solution Place 1 drop into both eyes 2 times daily MCV 91.0   MCH 29.8   MCHC 32.7 (L)   RDW 14.4   Platelets 660   Mean Platelet Volume 8.8   Diff Type Automated Differential   Neutrophils 77.6   Lymphocytes 16.6   Monocytes 3.8   Eosinophils 1.6   Basophils 0.4   ABS Neutrophils 7.7   ABS Lymphocytes 1.7   ABS Monocytes 0.4   ABS Eosinophils 0.2   ABS Basophils 0.0         3/4/2019 - A1c 7.5, PTH 65, TSH 1.88, Free T4 1.03     No results found for: WBC, RBC, HGB, HCT, MCV, MCH, MCHC, RDW, PLT, MPV, GRANULOCYTES, BANDS   No results found for: NA, K, CO2, BUN, CALCIUM, GFR   No results found for: TSH, T4FREE, U9QJQSJ, FT3, R2BGSBR, TSI, TPOABS, THGAB  Lab Results   Component Value Date    LABA1C 6.3 12/12/2018     No results found for: CHOL, TRIG, HDL, LDLDIRECT  No results found for: [address] [facility] [address] Records/Labs/Images review:   I personally reviewed and summarized previous records   All labs and imaging were reviewed independently     [address] [facility] [address], a 80 y.o.-old female seen in for the following issues     Type 2 DM   · High risk for falls, need to limit risk of hypoglycemia  · Slightly change Basaglar 20 units daily at bedtime  · Slightly change Humalog 6 units before breakfast and lunch and 8units before supper + same sliding scale  · The patient was advised to check blood sugars 4 times a day before meals and at bedtime and fax the results to our office in few weeks.   · Discussed with patient A1c and blood sugar goals   · Optimal blood sugars: 100-140 pre-prandial, < 180 peak post-prandial  · The patient counseled about the complications of uncontrolled diabetes   · Patient was counselled about the importance of self-blood glucose monitoring and eating consistent carb diet to avoid blood sugar fluctuations   · Patient up to date with the routine diabetes maintenance and prevention    Osteoporosis/VitD deficiency   · History of chronic steroid use and previous hyperparathyroidism  · Tolerating prolia very well · In the past she received 4 doses of prolia ([id number])  · We resumed Prolia in 2017, last dose 2/2019 ( Dtr aware when next dose in few weeks)  · Continue Vitamin D 5000 units daily  · Avoid prolonged immobilization, exercise advised    Secondary Adrenal Insufficiency  · Due to chronic steroid use. Pt on prolonged prednisone therapy for PMR   · Patient need to be on at least maintenance dose of steroid all the time and stress dose for any stressful events. Primary hypothyroidism  · Continue current Levothyroxine 100 mcg daily     H/o primary hyperparathyroidism   · s/p parathyroidectomy, large parathyroid adenoma by Dr. Yojana Aparicio in September 2004  · Ca and PTH remained normal     Hyperlipidemia  · Continue statin     Return in about 6 months (around 2/5/2020) for DM type 2 on insulin, Hypothyroidism, Osteoporosis, VitD deficiency . The above issues were reviewed with the patient who understood and agreed with the plan. Thank you for allowing us to participate in the care of this patient. Please do not hesitate to contact us with any additional questions. Diagnosis Orders   1. IDDM (insulin dependent diabetes mellitus) ([facility])  POCT glycosylated hemoglobin (Hb A1C)    Basic Metabolic Panel    Hemoglobin A1C    Microalbumin / Creatinine Urine Ratio   2. Osteoporosis, unspecified osteoporosis type, unspecified pathological fracture presence  Basic Metabolic Panel   3. Vitamin D deficiency  Vitamin D 25 Hydroxy   4. Hypothyroidism, unspecified type  T4, Free    TSH without Reflex   5.  H/O hyperparathyroidism  Basic Metabolic Panel    Vitamin D 25 Hydroxy       Zeinab Holm MD  Endocrinologist, [facility])   [address] [facility], [address] [place] [address],[address] [id number]   Phone: [phone]  Fax: [phone]  --------------------------------------------  Electronically signed

## 2019-08-05 NOTE — PROGRESS NOTES
Occupational History    Not on file   Social Needs    Financial resource strain: Not on file    Food insecurity:     Worry: Not on file     Inability: Not on file    Transportation needs:     Medical: Not on file     Non-medical: Not on file   Tobacco Use    Smoking status: Never Smoker    Smokeless tobacco: Never Used   Substance and Sexual Activity    Alcohol use: No    Drug use: No    Sexual activity: Not on file   Lifestyle    Physical activity:     Days per week: Not on file     Minutes per session: Not on file    Stress: Not on file   Relationships    Social connections:     Talks on phone: Not on file     Gets together: Not on file     Attends Rastafari service: Not on file     Active member of club or organization: Not on file     Attends meetings of clubs or organizations: Not on file     Relationship status: Not on file    Intimate partner violence:     Fear of current or ex partner: Not on file     Emotionally abused: Not on file     Physically abused: Not on file     Forced sexual activity: Not on file   Other Topics Concern    Not on file   Social History Narrative    Not on file     FAMILY HISTORY   Family History   Problem Relation Age of Onset    Diabetes Mother     Thyroid Disease Mother     Heart Disease Father     Diabetes Sister     Heart Disease Sister     Heart Attack Brother     Diabetes Sister     Heart Disease Sister     Heart Attack Brother     Diabetes Daughter     No Known Problems Son      ALLERGIES AND DRUG REACTIONS   No Known Allergies    CURRENT MEDICATIONS     Current Outpatient Medications   Medication Sig Dispense Refill    Insulin Pen Needle (BD PEN NEEDLE JAVIER U/F) 32G X 4 MM MISC 1 each by Does not apply route 4 times daily With insulin 350 each 5    vitamin C (ASCORBIC ACID) 500 MG tablet Take 500 mg by mouth daily      aspirin 81 MG tablet Take 81 mg by mouth daily      Cholecalciferol (VITAMIN D3) 5000 units TABS Take by mouth daily      lispro (HUMALOG KWIKPEN) 100 UNIT/ML pen Take 6 units before breakfast, 6 units before lunch and 9 units before supper + sliding scale. MAX 25 units daily (Patient taking differently: Take 8 units before breakfast, 8 units before lunch and 10 units before supper + sliding scale. MAX 35 units daily) 15 pen 5     No current facility-administered medications for this visit. Review of Systems  Constitutional: tired and fatigue. HEENT: No blurred vision, No sore throat, no ear pain, no hair loss  Neck: denied any neck swelling, difficulty swallowing,   Cardio-pulmonary: No CP, SOB or palpitation, No orthopnea or PND. No cough or wheezing. GI: No N/V/D, no constipation, No abdominal pain, no melena or hematochezia   : Denied any dysuria, hematuria, flank pain, discharge, or incontinence. Skin: denied any rash, ulcer, Hirsute, or hyperpigmentation. MSK: denied any joint deformity, joint pain/swelling, muscle pain, or back pain. Neuro: balance problem, using walker     OBJECTIVE    /82 (Site: Right Upper Arm, Position: Sitting, Cuff Size: Medium Adult)   Pulse 64   Resp 16   Ht 5' 2\" (1.575 m)   Wt 176 lb 3.2 oz (79.9 kg)   SpO2 98%   BMI 32.23 kg/m²   BP Readings from Last 4 Encounters:   08/05/19 132/82   04/01/19 112/78   12/12/18 118/70     Wt Readings from Last 6 Encounters:   08/05/19 176 lb 3.2 oz (79.9 kg)   04/01/19 174 lb 9.6 oz (79.2 kg)   12/12/18 180 lb 9.6 oz (81.9 kg)       Physical examination:  General: awake alert, oriented x3, no abnormal position or movements. HEENT: normocephalic non-traumatic, no exophthalmos   Neck: supple, no LN enlargement, no thyromegaly, no thyroid tenderness, no JVD. Pulm: Clear equal air entry no added sounds, no wheezing or rhonchi    CVS: S1 + S2, no murmur, no heave. Dorsalis pedis pulse palpable but weak. + LE edema bilateral    Abd: soft lax, no tenderness, no organomegaly, audible bowel sounds.    Skin: warm, no lesions, no rash. + callus, no

## 2019-12-09 ENCOUNTER — OFFICE VISIT (OUTPATIENT)
Dept: ENDOCRINOLOGY | Age: 84
End: 2019-12-09
Payer: MEDICARE

## 2019-12-09 VITALS
BODY MASS INDEX: 31.83 KG/M2 | RESPIRATION RATE: 16 BRPM | SYSTOLIC BLOOD PRESSURE: 132 MMHG | OXYGEN SATURATION: 98 % | DIASTOLIC BLOOD PRESSURE: 70 MMHG | WEIGHT: 173 LBS | HEART RATE: 78 BPM | HEIGHT: 62 IN

## 2019-12-09 DIAGNOSIS — E03.9 HYPOTHYROIDISM, UNSPECIFIED TYPE: ICD-10-CM

## 2019-12-09 DIAGNOSIS — E55.9 VITAMIN D DEFICIENCY: ICD-10-CM

## 2019-12-09 DIAGNOSIS — M81.0 OSTEOPOROSIS, UNSPECIFIED OSTEOPOROSIS TYPE, UNSPECIFIED PATHOLOGICAL FRACTURE PRESENCE: ICD-10-CM

## 2019-12-09 DIAGNOSIS — Z86.39 H/O HYPERPARATHYROIDISM: ICD-10-CM

## 2019-12-09 PROCEDURE — 1036F TOBACCO NON-USER: CPT | Performed by: INTERNAL MEDICINE

## 2019-12-09 PROCEDURE — G8484 FLU IMMUNIZE NO ADMIN: HCPCS | Performed by: INTERNAL MEDICINE

## 2019-12-09 PROCEDURE — G8427 DOCREV CUR MEDS BY ELIG CLIN: HCPCS | Performed by: INTERNAL MEDICINE

## 2019-12-09 PROCEDURE — 1123F ACP DISCUSS/DSCN MKR DOCD: CPT | Performed by: INTERNAL MEDICINE

## 2019-12-09 PROCEDURE — 1090F PRES/ABSN URINE INCON ASSESS: CPT | Performed by: INTERNAL MEDICINE

## 2019-12-09 PROCEDURE — 4040F PNEUMOC VAC/ADMIN/RCVD: CPT | Performed by: INTERNAL MEDICINE

## 2019-12-09 PROCEDURE — G8417 CALC BMI ABV UP PARAM F/U: HCPCS | Performed by: INTERNAL MEDICINE

## 2019-12-09 PROCEDURE — 99214 OFFICE O/P EST MOD 30 MIN: CPT | Performed by: INTERNAL MEDICINE

## 2019-12-09 RX ORDER — INSULIN LISPRO 100 [IU]/ML
INJECTION, SOLUTION INTRAVENOUS; SUBCUTANEOUS
Qty: 5 PEN | Refills: 12 | Status: SHIPPED
Start: 2019-12-09 | End: 2020-08-03 | Stop reason: SDUPTHER

## 2020-03-31 RX ORDER — LEVOTHYROXINE SODIUM 0.1 MG/1
TABLET ORAL
Qty: 90 TABLET | Refills: 3 | Status: SHIPPED | OUTPATIENT
Start: 2020-03-31

## 2020-04-06 LAB
AVERAGE GLUCOSE: NORMAL
BUN BLDV-MCNC: NORMAL MG/DL
CALCIUM SERPL-MCNC: 9.6 MG/DL
CHLORIDE BLD-SCNC: NORMAL MMOL/L
CO2: NORMAL
CREAT SERPL-MCNC: 1.01 MG/DL
GFR CALCULATED: 49
GLUCOSE BLD-MCNC: NORMAL MG/DL
HBA1C MFR BLD: 7.6 %
POTASSIUM SERPL-SCNC: 5 MMOL/L
SODIUM BLD-SCNC: 132 MMOL/L
T4 FREE: 1.05
TSH SERPL DL<=0.05 MIU/L-ACNC: 1.63 UIU/ML
VITAMIN D 25-HYDROXY: 54
VITAMIN D2, 25 HYDROXY: NORMAL
VITAMIN D3,25 HYDROXY: NORMAL

## 2020-04-20 RX ORDER — INSULIN GLARGINE 100 [IU]/ML
INJECTION, SOLUTION SUBCUTANEOUS
Qty: 5 PEN | Refills: 5 | Status: SHIPPED | OUTPATIENT
Start: 2020-04-20

## 2020-05-11 ENCOUNTER — VIRTUAL VISIT (OUTPATIENT)
Dept: ENDOCRINOLOGY | Age: 85
End: 2020-05-11
Payer: MEDICARE

## 2020-05-11 PROCEDURE — 1090F PRES/ABSN URINE INCON ASSESS: CPT | Performed by: INTERNAL MEDICINE

## 2020-05-11 PROCEDURE — 99214 OFFICE O/P EST MOD 30 MIN: CPT | Performed by: INTERNAL MEDICINE

## 2020-05-11 PROCEDURE — 3051F HG A1C>EQUAL 7.0%<8.0%: CPT | Performed by: INTERNAL MEDICINE

## 2020-05-11 PROCEDURE — 1123F ACP DISCUSS/DSCN MKR DOCD: CPT | Performed by: INTERNAL MEDICINE

## 2020-05-11 PROCEDURE — 4040F PNEUMOC VAC/ADMIN/RCVD: CPT | Performed by: INTERNAL MEDICINE

## 2020-05-11 PROCEDURE — G8417 CALC BMI ABV UP PARAM F/U: HCPCS | Performed by: INTERNAL MEDICINE

## 2020-05-11 PROCEDURE — G8427 DOCREV CUR MEDS BY ELIG CLIN: HCPCS | Performed by: INTERNAL MEDICINE

## 2020-05-11 PROCEDURE — 1036F TOBACCO NON-USER: CPT | Performed by: INTERNAL MEDICINE

## 2020-05-11 RX ORDER — SIMVASTATIN 20 MG
20 TABLET ORAL NIGHTLY
COMMUNITY

## 2020-05-11 NOTE — PROGRESS NOTES
lumbar spine is -2. FRAX score;major osteoporotic fracture risk in the next 10 years is 23% and hip fracture is 9.7%. Bisphosphonates are contraindicated as estimated GFR < 35   Tolerating prolia very well  In the past she received 4 doses of prolia (7929-2335)  We resumed Prolia in 2017, last dose 2/2019 ( Dtr aware when next dose due 2/24/2020)  Received Prolia injection at infusion center in [address] [place] [address] Gaurav scan 10/2014 --> BMD has increased by 22.5% since the prior study of 04/09/2012 and has increased by 14.3% since the baseline study of 11/29/2000    DEXA scan  11/2016 --> BMD has decreased by 2.9% hip, 4.9%forarm since the prior study of 2014     On vitD3 5000 units daily  VitD level 53 was 41.9 was 43.3 was 35.3   The patient denies any history of kidney stones    PAST MEDICAL HISTORY   Past Medical History:   Diagnosis Date    B12 deficiency     Current chronic use of systemic steroids     DM type 2 (diabetes mellitus, type 2) ([facility])     GERD (gastroesophageal reflux disease)     Hypercalcemia     Hyperlipidemia     Osteoporosis     PMR (polymyalgia rheumatica) ([facility])     Primary hyperparathyroidism ([address]     Secondary adrenal insufficiency ([facility])     Vitamin D deficiency      PAST SURGICAL HISTORY   Past Surgical History:   Procedure Laterality Date    CHOLECYSTECTOMY      PARATHYROIDECTOMY      TOTAL HIP ARTHROPLASTY Left     Lt    TOTAL KNEE ARTHROPLASTY Right      SOCIAL HISTORY   Tobacco:   reports that she has never smoked. She has never used smokeless tobacco.  Alcol:   reports no history of alcohol use. Illicit Drugs:   reports no history of drug use.     FAMILY HISTORY   Family History   Problem Relation Age of Onset    Diabetes Mother     Thyroid Disease Mother     Heart Disease Father     Diabetes Sister     Heart Disease Sister     Heart Attack Brother     Diabetes Sister     Heart Disease Sister     Heart Attack Brother     Diabetes Daughter     No Known Problems Son      ALLERGIES AND DRUG REACTIONS   No Known Allergies    CURRENT MEDICATIONS     Current Outpatient Medications   Medication Sig Dispense Refill    simvastatin (ZOCOR) 20 MG tablet Take 20 mg by mouth nightly      insulin glargine (BASAGLAR KWIKPEN) 100 UNIT/ML injection pen Inject 20 units daily at night 5 pen 5    levothyroxine (SYNTHROID) 100 MCG tablet Take 1 tablet by mouth once daily 90 tablet 3    Insulin Pen Needle (BD PEN NEEDLE JAVIER U/F) 32G X 4 MM MISC 1 each by Does not apply route 4 times daily With insulin 200 each 5    insulin lispro, 1 Unit Dial, (HUMALOG KWIKPEN) 100 UNIT/ML SOPN Take 6 unit with breakfast and lunch and 8 units with supper + siding scale. MAX 30 units daily 5 pen 12    denosumab (PROLIA) 60 MG/ML SOSY SC injection Inject 1 ml (60 mg) into the skin every 6 months 1 Syringe 3    vitamin C (ASCORBIC ACID) 500 MG tablet Take 500 mg by mouth daily      aspirin 81 MG tablet Take 81 mg by mouth daily      Cholecalciferol (VITAMIN D3) 5000 units TABS Take by mouth daily      Coenzyme Q10 (COQ-10) 100 MG CAPS Take by mouth daily      Omega-3 Fatty Acids (FISH OIL) 1000 MG CAPS Take 3,000 mg by mouth daily      furosemide (LASIX) 40 MG tablet Take 40 mg by mouth daily      lisinopril (PRINIVIL;ZESTRIL) 2.5 MG tablet Take 2.5 mg by mouth nightly      magnesium chloride (MAG64) 64 MG TBEC extended release tablet Take 1 tablet by mouth daily (with breakfast)      nitroGLYCERIN (NITROSTAT) 0.4 MG SL tablet Place 0.4 mg under the tongue every 5 minutes as needed for Chest pain up to max of 3 total doses. If no relief after 1 dose, call 911.       omeprazole (PRILOSEC) 20 MG delayed release capsule Take 40 mg by mouth Daily       predniSONE (DELTASONE) 5 MG tablet Take 7.5 mg by mouth daily      Multiple Vitamins-Minerals (PRESERVISION AREDS PO) Take by mouth 2 times daily       Timolol (TIMOPTIC) 0.5 % (DAILY) SOLN ophthalmic solution Place 1 drop into both eyes 2 times

## 2020-05-11 NOTE — PROGRESS NOTES
Hilary Raya was read the following message We want to confirm that, for purposes of billing, this is a virtual visit with your provider for which we will submit a claim for reimbursement with your insurance company. You will be responsible for any copays, coinsurance amounts or other amounts not covered by your insurance company. If you do not accept this, unfortunately we will not be able to schedule a virtual visit with the provider. Do you accept?  Mikhail Baker responded Letha Veliz

## 2020-05-11 NOTE — LETTER
 GERD (gastroesophageal reflux disease)     Hypercalcemia     Hyperlipidemia     Osteoporosis     PMR (polymyalgia rheumatica) (HCC)     Primary hyperparathyroidism (HCC)     Secondary adrenal insufficiency (HCC)     Vitamin D deficiency      PAST SURGICAL HISTORY   Past Surgical History:   Procedure Laterality Date    CHOLECYSTECTOMY      PARATHYROIDECTOMY      TOTAL HIP ARTHROPLASTY Left     Lt    TOTAL KNEE ARTHROPLASTY Right      SOCIAL HISTORY   Tobacco:   reports that she has never smoked. She has never used smokeless tobacco.  Alcol:   reports no history of alcohol use. Illicit Drugs:   reports no history of drug use. FAMILY HISTORY   Family History   Problem Relation Age of Onset    Diabetes Mother     Thyroid Disease Mother     Heart Disease Father     Diabetes Sister     Heart Disease Sister     Heart Attack Brother     Diabetes Sister     Heart Disease Sister     Heart Attack Brother     Diabetes Daughter     No Known Problems Son      ALLERGIES AND DRUG REACTIONS   No Known Allergies    CURRENT MEDICATIONS     Current Outpatient Medications   Medication Sig Dispense Refill    simvastatin (ZOCOR) 20 MG tablet Take 20 mg by mouth nightly      insulin glargine (BASAGLAR KWIKPEN) 100 UNIT/ML injection pen Inject 20 units daily at night 5 pen 5    levothyroxine (SYNTHROID) 100 MCG tablet Take 1 tablet by mouth once daily 90 tablet 3    Insulin Pen Needle (BD PEN NEEDLE JAVIER U/F) 32G X 4 MM MISC 1 each by Does not apply route 4 times daily With insulin 200 each 5    insulin lispro, 1 Unit Dial, (HUMALOG KWIKPEN) 100 UNIT/ML SOPN Take 6 unit with breakfast and lunch and 8 units with supper + siding scale.  MAX 30 units daily 5 pen 12    denosumab (PROLIA) 60 MG/ML SOSY SC injection Inject 1 ml (60 mg) into the skin every 6 months 1 Syringe 3    vitamin C (ASCORBIC ACID) 500 MG tablet Take 500 mg by mouth daily      aspirin 81 MG tablet Take 81 mg by mouth daily

## 2020-05-27 ENCOUNTER — TELEPHONE (OUTPATIENT)
Dept: ENDOCRINOLOGY | Age: 85
End: 2020-05-27

## 2020-08-03 RX ORDER — INSULIN LISPRO 100 [IU]/ML
INJECTION, SOLUTION INTRAVENOUS; SUBCUTANEOUS
Qty: 5 PEN | Refills: 5 | Status: SHIPPED | OUTPATIENT
Start: 2020-08-03

## 2020-08-26 RX ORDER — PEN NEEDLE, DIABETIC 32GX 5/32"
1 NEEDLE, DISPOSABLE MISCELLANEOUS 4 TIMES DAILY
Qty: 200 EACH | Refills: 5 | Status: SHIPPED | OUTPATIENT
Start: 2020-08-26

## 2020-08-31 LAB — DIABETIC RETINOPATHY: NEGATIVE

## 2020-10-26 LAB
AVERAGE GLUCOSE: NORMAL
BUN BLDV-MCNC: 30 MG/DL
CALCIUM SERPL-MCNC: 10 MG/DL
CHLORIDE BLD-SCNC: 98 MMOL/L
CO2: 30 MMOL/L
CREAT SERPL-MCNC: 0.98 MG/DL
GFR CALCULATED: 50
GLUCOSE BLD-MCNC: 165 MG/DL
HBA1C MFR BLD: 6.7 %
POTASSIUM SERPL-SCNC: 4.6 MMOL/L
SODIUM BLD-SCNC: 133 MMOL/L
T4 FREE: 1.24
VITAMIN D 25-HYDROXY: 63
VITAMIN D2, 25 HYDROXY: NORMAL
VITAMIN D3,25 HYDROXY: NORMAL

## 2020-11-09 ENCOUNTER — OFFICE VISIT (OUTPATIENT)
Dept: ENDOCRINOLOGY | Age: 85
End: 2020-11-09
Payer: MEDICARE

## 2020-11-09 VITALS
TEMPERATURE: 98.2 F | SYSTOLIC BLOOD PRESSURE: 122 MMHG | WEIGHT: 162 LBS | BODY MASS INDEX: 29.63 KG/M2 | HEART RATE: 72 BPM | OXYGEN SATURATION: 95 % | DIASTOLIC BLOOD PRESSURE: 68 MMHG

## 2020-11-09 PROCEDURE — G8417 CALC BMI ABV UP PARAM F/U: HCPCS | Performed by: INTERNAL MEDICINE

## 2020-11-09 PROCEDURE — 99214 OFFICE O/P EST MOD 30 MIN: CPT | Performed by: INTERNAL MEDICINE

## 2020-11-09 PROCEDURE — 1090F PRES/ABSN URINE INCON ASSESS: CPT | Performed by: INTERNAL MEDICINE

## 2020-11-09 PROCEDURE — G8428 CUR MEDS NOT DOCUMENT: HCPCS | Performed by: INTERNAL MEDICINE

## 2020-11-09 PROCEDURE — 4040F PNEUMOC VAC/ADMIN/RCVD: CPT | Performed by: INTERNAL MEDICINE

## 2020-11-09 PROCEDURE — G8484 FLU IMMUNIZE NO ADMIN: HCPCS | Performed by: INTERNAL MEDICINE

## 2020-11-09 PROCEDURE — 1123F ACP DISCUSS/DSCN MKR DOCD: CPT | Performed by: INTERNAL MEDICINE

## 2020-11-09 PROCEDURE — 1036F TOBACCO NON-USER: CPT | Performed by: INTERNAL MEDICINE

## 2021-05-20 ENCOUNTER — TELEPHONE (OUTPATIENT)
Dept: ENDOCRINOLOGY | Age: 86
End: 2021-05-20